# Patient Record
Sex: MALE | Race: WHITE | Employment: OTHER | URBAN - METROPOLITAN AREA
[De-identification: names, ages, dates, MRNs, and addresses within clinical notes are randomized per-mention and may not be internally consistent; named-entity substitution may affect disease eponyms.]

---

## 2023-05-25 ENCOUNTER — LAB REQUISITION (OUTPATIENT)
Dept: LAB | Facility: CLINIC | Age: 63
End: 2023-05-25

## 2023-05-25 PROCEDURE — 88342 IMHCHEM/IMCYTCHM 1ST ANTB: CPT | Mod: TC | Performed by: DENTIST

## 2024-04-03 RX ORDER — GABAPENTIN 300 MG/1
300 TABLET, FILM COATED ORAL
Status: ON HOLD | COMMUNITY
End: 2024-04-24

## 2024-04-03 RX ORDER — SIMVASTATIN 10 MG
10 TABLET ORAL AT BEDTIME
Status: ON HOLD | COMMUNITY
End: 2024-04-24

## 2024-04-04 NOTE — PROVIDER NOTIFICATION
Discharge plan according to [facility]:         04/03/24 1116   Discharge Planning   Patient/Family Anticipates Transition to home with family   Living Arrangements   People in Home significant other   Type of Residence Private Residence   Is your private residence a single family home or apartment? Single family home   Number of Stairs, Within Home, Primary ten   Stair Railings, Within Home, Primary railings safe and in good condition   Bathroom Shower/Tub Walk-in shower   Equipment Currently Used at Home raised toilet seat   Support System   Support Systems Spouse/Significant Other   Do you have someone available to stay with you one or two nights once you are home? Yes

## 2024-04-10 NOTE — H&P (VIEW-ONLY)
Preoperative History and Physical Examination    Date of Exam: 4/10/2024  Proposed Surgery: Left Total Knee Replacement  Surgeon: Dr Damián Dodson  Date of Surgery: 4/24/24  Location of Surgery:  [facility]  Fax number: [phone]    Joey Gross is an 63 y.o. male who presents for a preoperative clearance history and physical exam at the request of the above mentioned surgeon for the surgery indicated.     Copy of this evaluation and/or correspondence will be faxed to the above hospital/surgery center and/or surgeon's office.     INDICATION FOR SURGERY: Left knee arthritis    HPI: History of chronic knee pain, left knee arthritis following with orthopedics, recommends upcoming procedure.  Has a history of previous colon cancer, currently stable.  Has a history of ameloblastoma of the mandible.  Previously followed with OMFS.  Previous resection and doing well.  Does have a history of hyperlipidemia, takes Crestor.  Would be interested in trialing Viagra due to recent erectile dysfunction    CURRENT COMORBIDITIES: Hypertension and Cancer    USE OF ANTITHROMBOTIC: None    Current Outpatient Medications:   Medication Sig     acetaminophen (TYLENOL) 325 mg oral tablet Take 2 tablets (650 mg) by mouth every 6 (six) hours as needed.     cetirizine (ZYRTEC) 10 mg oral Cap Take 10 mg by mouth Once Daily.     cyclobenzaprine (FLEXERIL) 10 mg oral tablet Take 1 tablet (10 mg) by mouth three times a day as needed.     EPINEPHrine (EPIPEN) 0.3 mg/0.3 mL Injection auto-injector Inject 0.3 mL (0.3 mg) into the muscle one time as needed, may repeat x 1 for acute allergic reaction.     gabapentin (NEURONTIN) 300 mg oral capsule Take 1 capsule (300 mg) by mouth at bedtime.     ibuprofen 800 mg oral tablet Take 1 tablet (800 mg) by mouth every 8 (eight) hours as needed.     Oxycodone-Acetaminophen 7.5-325 mg oral tablet Take 1 tablet by mouth every 6 (six) hours as needed.     rosuvastatin (CRESTOR) 10 mg oral tablet  Take 1 tablet (10 mg) by mouth once daily.       Allergies   Allergen Reactions     Bee Sting  ([place]) Anaphylaxis       Past Medical History:   Diagnosis Date     Chronic back pain     lower     Colorectal cancer (HCC) 2023     Hyperlipidemia      Sinus infection      Tobacco abuse 2000    Quit in  after 20 py       Past Surgical History:   Procedure Laterality Date     COLONOSCOPY  2012    hyperplastic polyp     HX SURGERY Right 2020    Right Groin Excision Chronic Inflammation/Infection      MAXILLA OR MANDIBLE RESECTIO      2023     ORTHOPEDICS      bilateral knee arthroscopy     REPAIR INGUINAL HERNIA, SLIDING, ANY [id number] Right 2019       Family History   Problem Relation Name Age of Onset     Alzheimer's Disease Maternal Grandfather       Arthritis Mother       High Blood Pressure Mother       Diabetes Father       Skin Melanoma Father       High Cholesterol Father       Heart Disease Father       Lymphoma Father         Social History     Tobacco Use     Smoking status: Former     Current packs/day: 0.00     Types: Cigarettes     Quit date: 2000     Years since quittin.9     Passive exposure: Past     Smokeless tobacco: Never     Tobacco comments:     quit smoking around age 41   Vaping Use     Vaping status: Never Used   Substance Use Topics     Alcohol use: Yes     Comment: rarely     Drug use: No       History of Anesthetic Reaction (personal or family)? no    Recent steroid use (last 6 months)? no    Immunizations up to date? yes    Immunization History   Administered Date(s) Administered     - Fluvirin, 4 Yrs + 10/21/2010     Influenza 10/09/2007, 2008, 10/16/2009, 2022     Moderna 12+ Yrs Bivalent COVID Vaccine 2022     Pfizer 12+ Yrs MONOVALENT COVID Vaccine 2022     Pfizer 12+ Yrs MONOVALENT COVID Vaccine (purple cap) 2021, 2021     Tdap 2017       REVIEW OF SYSTEMS:  see HPI; otherwise denies HEENT, NECK, RESP, CARDIAC,  "GI, , NEURO or PSYCH Sx    REVISED CARDIAC RISK INDEX:   History of Ischemic Heart Disease: no  History of Congestive Heart Failure: no  History of Cerebrovascular Disease (stroke/TIA): no  History of Diabetes requiring preoperative insulin use: no  Chronic Kidney Disease (Cr > 2): no  Undergoing suprainguinal vascular, intraperitoneal, or intrathoracic surgery: no  Risk for cardiac death, non-fatal MI, and non-fatal cardiac arrest: 0 predictors - 0.4%    PHYSICAL EXAM:   /88 (BP Cuff Site: Left arm, BP Cuff Position: Sitting, BP Cuff Size: Large Adult)   Pulse 72   Temp 97.4  F (36.3  C) (Tympanic)   Resp 16   Ht 5' 8\" (1.727 m)   Wt 118 kg (260 lb 1.6 oz)   SpO2 96%   BMI 39.55 kg/m    LMP: No LMP for male patient.  General - Alert & oriented, pleasant and comfortable.   Head - Normocephalic, atraumatic.  Eyes - Pupils are equal, round and reactive to light bilaterally.  Extraocular movements are intact bilaterally. Sclera and conjunctiva clear. Lids without lesions  Ears - Tympanic membranes clear bilaterally. External canals without lesion.  Nose - Nares normal. Septum midline. Mucosa normal.   Mouth - Oropharynx is clear without exudates.  Neck - Normal appearing, no cervical adenopathy or carotid bruits noted.  Lungs - Clear to auscultation bilaterally, no wheezes, rales or rhonchi.  CV - Regular rate and rhythm, no murmurs, rubs or gallops.  Abdomen - Non-tender, non-distended, positive bowel sounds, no masses, no hepatosplenomegaly. No rebound or guarding.   Extremities - No edema or deformities. Palpable pulses strong bilaterally.  Skin - warm, dry, intact. No rashes or erythema.  Neurologic - Cranial nerves 2-12 intact, patellar reflexes intact. Muscle tone, bulk and strength within normal limits throughout.  Psych - Judgment and mental status are clear, patient has reasonable insight. Mood is stable.    LABS/IMAGING:     No results found for this or any previous visit (from the past 24 " hour(s)).    Labs not resulted here are pending and will be faxed     EKG: [facility]                                                                                   Test Date:    2024-04-10   Pat Name:     ANNA CANTU            Department:   [facility]   Patient ID:   [id number]                   Room:           Gender:       M                        Technician:   Anitha   :          1960               Requested By: BEULAH LI MD   Order Number: [id number]                Reading MD:   JOANNE Jarquin                                    Measurements   Intervals                              Axis            Rate:         65                       P:            39   NH:           171                      QRS:          71   QRSD:         91                       T:            62   QT:           416                                      QTc:          427                                                                 Interpretive Statements   SINUS RHYTHM   Compared to ECG 2023 13:56:16   No significant changes   Electronically    Signed On 4- 10:20:13 CDT by JOANNE Jarquin   CXR: Not indicated  Spirometry: Not indicated    ASSESSMENT/PLAN:    Herson was seen today for pre op exam.    Diagnoses and all orders for this visit:    Pre-operative clearance  -     EKG WITH INTERPRETATION/REPORT  -     CBC/DIFFERENTIAL OP  -     BASIC METABOLIC PANEL 8 ([facility])    Preoperative examination    Erectile dysfunction, unspecified erectile dysfunction type  -     sildenafiL (VIAGRA) 100 mg oral tablet; Take 0.5 tablets (50 mg) by mouth once a day as needed.    Screening for colon cancer  -     REFERRAL COLONOSCOPY: [facility]    Other orders  -     gabapentin (NEURONTIN) 100 mg oral capsule; Take 3 capsules (300 mg) by mouth three times a day.          RECOMMENDATIONS:   Preoperative Risk Assesment:  - Based on the inherent risks of procedure, anesthesia, and the patient's comorbid  medical conditions, the patient is stratified as a medium risk candidate for the planned procedure.  - Further tests are not advised to further risk stratify Joey Gross prior to surgery.    Patient was advised to D/c all NSAID's, fish oil, and ASA derivatives one week prior to surgery, and that these may be resumed postoperatively unless instructed otherwise.     Patient does not require perioperative bridging of anticoagulation.     Other diagnoses as noted in the Assessment and Plan are stable at the present time unless otherwise stated.     Additional recommendations: none      Advised to bring Advanced Directive on day of surgery if one is available.     Patient had the opportunity to have all his questions answered at today's visit.     aJmes Mcelroy PA-C    Total time spent today for visit was 45 minutes and included: Direct face-to-face time, Review of records, and Documentation of visit    Today's care for complex conditions is part of an established longitudinal relationship.      .

## 2024-04-19 RX ORDER — SILDENAFIL 100 MG/1
50 TABLET, FILM COATED ORAL DAILY PRN
COMMUNITY

## 2024-04-24 ENCOUNTER — ANESTHESIA (OUTPATIENT)
Dept: SURGERY | Facility: CLINIC | Age: 64
End: 2024-04-24
Payer: COMMERCIAL

## 2024-04-24 ENCOUNTER — HOSPITAL ENCOUNTER (OUTPATIENT)
Facility: CLINIC | Age: 64
Discharge: HOME | End: 2024-04-25
Attending: STUDENT IN AN ORGANIZED HEALTH CARE EDUCATION/TRAINING PROGRAM | Admitting: STUDENT IN AN ORGANIZED HEALTH CARE EDUCATION/TRAINING PROGRAM
Payer: COMMERCIAL

## 2024-04-24 ENCOUNTER — APPOINTMENT (OUTPATIENT)
Dept: RADIOLOGY | Facility: CLINIC | Age: 64
End: 2024-04-24
Payer: COMMERCIAL

## 2024-04-24 ENCOUNTER — ANCILLARY PROCEDURE (OUTPATIENT)
Dept: ULTRASOUND IMAGING | Facility: CLINIC | Age: 64
End: 2024-04-24
Attending: STUDENT IN AN ORGANIZED HEALTH CARE EDUCATION/TRAINING PROGRAM
Payer: COMMERCIAL

## 2024-04-24 ENCOUNTER — ANESTHESIA EVENT (OUTPATIENT)
Dept: SURGERY | Facility: CLINIC | Age: 64
End: 2024-04-24
Payer: COMMERCIAL

## 2024-04-24 DIAGNOSIS — Z96.652 STATUS POST TOTAL KNEE REPLACEMENT USING CEMENT, LEFT: Primary | ICD-10-CM

## 2024-04-24 PROCEDURE — 99207 PR APP CREDIT; MD BILLING SHARED VISIT: CPT

## 2024-04-24 PROCEDURE — 250N000013 HC RX MED GY IP 250 OP 250 PS 637

## 2024-04-24 PROCEDURE — 999N000141 HC STATISTIC PRE-PROCEDURE NURSING ASSESSMENT: Performed by: STUDENT IN AN ORGANIZED HEALTH CARE EDUCATION/TRAINING PROGRAM

## 2024-04-24 PROCEDURE — 250N000011 HC RX IP 250 OP 636

## 2024-04-24 PROCEDURE — 250N000011 HC RX IP 250 OP 636: Performed by: NURSE ANESTHETIST, CERTIFIED REGISTERED

## 2024-04-24 PROCEDURE — 258N000003 HC RX IP 258 OP 636

## 2024-04-24 PROCEDURE — 99205 OFFICE O/P NEW HI 60 MIN: CPT | Mod: FS | Performed by: STUDENT IN AN ORGANIZED HEALTH CARE EDUCATION/TRAINING PROGRAM

## 2024-04-24 PROCEDURE — C1713 ANCHOR/SCREW BN/BN,TIS/BN: HCPCS | Performed by: STUDENT IN AN ORGANIZED HEALTH CARE EDUCATION/TRAINING PROGRAM

## 2024-04-24 PROCEDURE — 250N000011 HC RX IP 250 OP 636: Performed by: STUDENT IN AN ORGANIZED HEALTH CARE EDUCATION/TRAINING PROGRAM

## 2024-04-24 PROCEDURE — 710N000010 HC RECOVERY PHASE 1, LEVEL 2, PER MIN: Performed by: STUDENT IN AN ORGANIZED HEALTH CARE EDUCATION/TRAINING PROGRAM

## 2024-04-24 PROCEDURE — 250N000009 HC RX 250: Performed by: NURSE ANESTHETIST, CERTIFIED REGISTERED

## 2024-04-24 PROCEDURE — 258N000003 HC RX IP 258 OP 636: Performed by: STUDENT IN AN ORGANIZED HEALTH CARE EDUCATION/TRAINING PROGRAM

## 2024-04-24 PROCEDURE — 272N000001 HC OR GENERAL SUPPLY STERILE: Performed by: STUDENT IN AN ORGANIZED HEALTH CARE EDUCATION/TRAINING PROGRAM

## 2024-04-24 PROCEDURE — 999N000065 XR KNEE PORT LEFT 1/2 VIEWS: Mod: LT

## 2024-04-24 PROCEDURE — 360N000077 HC SURGERY LEVEL 4, PER MIN: Performed by: STUDENT IN AN ORGANIZED HEALTH CARE EDUCATION/TRAINING PROGRAM

## 2024-04-24 PROCEDURE — C1776 JOINT DEVICE (IMPLANTABLE): HCPCS | Performed by: STUDENT IN AN ORGANIZED HEALTH CARE EDUCATION/TRAINING PROGRAM

## 2024-04-24 PROCEDURE — 370N000017 HC ANESTHESIA TECHNICAL FEE, PER MIN: Performed by: STUDENT IN AN ORGANIZED HEALTH CARE EDUCATION/TRAINING PROGRAM

## 2024-04-24 PROCEDURE — 258N000001 HC RX 258: Performed by: STUDENT IN AN ORGANIZED HEALTH CARE EDUCATION/TRAINING PROGRAM

## 2024-04-24 PROCEDURE — 250N000009 HC RX 250

## 2024-04-24 PROCEDURE — 250N000009 HC RX 250: Performed by: STUDENT IN AN ORGANIZED HEALTH CARE EDUCATION/TRAINING PROGRAM

## 2024-04-24 DEVICE — ATTUNE KNEE SYSTEM TIBIAL BASE FIXED BEARING SIZE 7 CEMENTED
Type: IMPLANTABLE DEVICE | Site: KNEE | Status: FUNCTIONAL
Brand: ATTUNE

## 2024-04-24 DEVICE — FULL DOSE BONE CEMENT, 10 PACK CATALOG NUMBER IS 6191-1-010
Type: IMPLANTABLE DEVICE | Site: KNEE | Status: FUNCTIONAL
Brand: SIMPLEX

## 2024-04-24 DEVICE — ATTUNE KNEE SYSTEM TIBIAL INSERT FIXED BEARING MEDIAL STABILIZED LEFT AOX 6, 6MM
Type: IMPLANTABLE DEVICE | Site: KNEE | Status: FUNCTIONAL
Brand: ATTUNE

## 2024-04-24 DEVICE — ATTUNE KNEE SYSTEM FEMORAL CRUCIATE RETAINING SIZE 6 LEFT CEMENTED
Type: IMPLANTABLE DEVICE | Site: KNEE | Status: FUNCTIONAL
Brand: ATTUNE

## 2024-04-24 RX ORDER — CEFAZOLIN SODIUM/WATER 2 G/20 ML
2 SYRINGE (ML) INTRAVENOUS
Status: COMPLETED | OUTPATIENT
Start: 2024-04-24 | End: 2024-04-24

## 2024-04-24 RX ORDER — HYDROMORPHONE HCL IN WATER/PF 6 MG/30 ML
0.4 PATIENT CONTROLLED ANALGESIA SYRINGE INTRAVENOUS EVERY 5 MIN PRN
Status: DISCONTINUED | OUTPATIENT
Start: 2024-04-24 | End: 2024-04-24 | Stop reason: HOSPADM

## 2024-04-24 RX ORDER — ONDANSETRON 2 MG/ML
4 INJECTION INTRAMUSCULAR; INTRAVENOUS EVERY 30 MIN PRN
Status: CANCELLED | OUTPATIENT
Start: 2024-04-24

## 2024-04-24 RX ORDER — ACETAMINOPHEN 325 MG/1
650 TABLET ORAL EVERY 4 HOURS PRN
Status: DISCONTINUED | OUTPATIENT
Start: 2024-04-27 | End: 2024-04-25 | Stop reason: HOSPADM

## 2024-04-24 RX ORDER — ROSUVASTATIN CALCIUM 10 MG/1
10 TABLET, COATED ORAL DAILY
Status: DISCONTINUED | OUTPATIENT
Start: 2024-04-24 | End: 2024-04-25 | Stop reason: HOSPADM

## 2024-04-24 RX ORDER — KETAMINE HYDROCHLORIDE 10 MG/ML
INJECTION INTRAMUSCULAR; INTRAVENOUS PRN
Status: DISCONTINUED | OUTPATIENT
Start: 2024-04-24 | End: 2024-04-24

## 2024-04-24 RX ORDER — ONDANSETRON 2 MG/ML
4 INJECTION INTRAMUSCULAR; INTRAVENOUS EVERY 30 MIN PRN
Status: DISCONTINUED | OUTPATIENT
Start: 2024-04-24 | End: 2024-04-24 | Stop reason: HOSPADM

## 2024-04-24 RX ORDER — GABAPENTIN 100 MG/1
200-300 CAPSULE ORAL AT BEDTIME
Status: DISCONTINUED | OUTPATIENT
Start: 2024-04-24 | End: 2024-04-25 | Stop reason: HOSPADM

## 2024-04-24 RX ORDER — HYDROMORPHONE HCL IN WATER/PF 6 MG/30 ML
0.4 PATIENT CONTROLLED ANALGESIA SYRINGE INTRAVENOUS
Status: DISCONTINUED | OUTPATIENT
Start: 2024-04-24 | End: 2024-04-25 | Stop reason: HOSPADM

## 2024-04-24 RX ORDER — ASPIRIN 81 MG/1
81 TABLET ORAL 2 TIMES DAILY
Status: DISCONTINUED | OUTPATIENT
Start: 2024-04-24 | End: 2024-04-25 | Stop reason: HOSPADM

## 2024-04-24 RX ORDER — MULTIPLE VITAMINS W/ MINERALS TAB 9MG-400MCG
1 TAB ORAL DAILY
COMMUNITY

## 2024-04-24 RX ORDER — AMOXICILLIN 250 MG
1 CAPSULE ORAL 2 TIMES DAILY
Status: DISCONTINUED | OUTPATIENT
Start: 2024-04-24 | End: 2024-04-25 | Stop reason: HOSPADM

## 2024-04-24 RX ORDER — ONDANSETRON 2 MG/ML
4 INJECTION INTRAMUSCULAR; INTRAVENOUS EVERY 6 HOURS PRN
Status: DISCONTINUED | OUTPATIENT
Start: 2024-04-24 | End: 2024-04-25 | Stop reason: HOSPADM

## 2024-04-24 RX ORDER — NALOXONE HYDROCHLORIDE 0.4 MG/ML
0.2 INJECTION, SOLUTION INTRAMUSCULAR; INTRAVENOUS; SUBCUTANEOUS
Status: DISCONTINUED | OUTPATIENT
Start: 2024-04-24 | End: 2024-04-25 | Stop reason: HOSPADM

## 2024-04-24 RX ORDER — BISACODYL 10 MG
10 SUPPOSITORY, RECTAL RECTAL DAILY PRN
Status: DISCONTINUED | OUTPATIENT
Start: 2024-04-27 | End: 2024-04-25 | Stop reason: HOSPADM

## 2024-04-24 RX ORDER — OXYCODONE HYDROCHLORIDE 5 MG/1
10 TABLET ORAL EVERY 4 HOURS PRN
Status: DISCONTINUED | OUTPATIENT
Start: 2024-04-24 | End: 2024-04-25 | Stop reason: HOSPADM

## 2024-04-24 RX ORDER — CEFAZOLIN SODIUM/WATER 2 G/20 ML
2 SYRINGE (ML) INTRAVENOUS SEE ADMIN INSTRUCTIONS
Status: DISCONTINUED | OUTPATIENT
Start: 2024-04-24 | End: 2024-04-24 | Stop reason: HOSPADM

## 2024-04-24 RX ORDER — LIDOCAINE 40 MG/G
CREAM TOPICAL
Status: DISCONTINUED | OUTPATIENT
Start: 2024-04-24 | End: 2024-04-25 | Stop reason: HOSPADM

## 2024-04-24 RX ORDER — ONDANSETRON 2 MG/ML
INJECTION INTRAMUSCULAR; INTRAVENOUS PRN
Status: DISCONTINUED | OUTPATIENT
Start: 2024-04-24 | End: 2024-04-24

## 2024-04-24 RX ORDER — NALOXONE HYDROCHLORIDE 0.4 MG/ML
0.4 INJECTION, SOLUTION INTRAMUSCULAR; INTRAVENOUS; SUBCUTANEOUS
Status: DISCONTINUED | OUTPATIENT
Start: 2024-04-24 | End: 2024-04-25 | Stop reason: HOSPADM

## 2024-04-24 RX ORDER — HYDROMORPHONE HCL IN WATER/PF 6 MG/30 ML
0.2 PATIENT CONTROLLED ANALGESIA SYRINGE INTRAVENOUS EVERY 5 MIN PRN
Status: DISCONTINUED | OUTPATIENT
Start: 2024-04-24 | End: 2024-04-24 | Stop reason: HOSPADM

## 2024-04-24 RX ORDER — ONDANSETRON 4 MG/1
4 TABLET, ORALLY DISINTEGRATING ORAL EVERY 6 HOURS PRN
Status: DISCONTINUED | OUTPATIENT
Start: 2024-04-24 | End: 2024-04-25 | Stop reason: HOSPADM

## 2024-04-24 RX ORDER — METHOCARBAMOL 750 MG/1
750 TABLET, FILM COATED ORAL EVERY 6 HOURS PRN
Status: DISCONTINUED | OUTPATIENT
Start: 2024-04-24 | End: 2024-04-25 | Stop reason: HOSPADM

## 2024-04-24 RX ORDER — PROCHLORPERAZINE MALEATE 10 MG
10 TABLET ORAL EVERY 6 HOURS PRN
Status: DISCONTINUED | OUTPATIENT
Start: 2024-04-24 | End: 2024-04-25 | Stop reason: HOSPADM

## 2024-04-24 RX ORDER — SODIUM CHLORIDE, SODIUM LACTATE, POTASSIUM CHLORIDE, CALCIUM CHLORIDE 600; 310; 30; 20 MG/100ML; MG/100ML; MG/100ML; MG/100ML
INJECTION, SOLUTION INTRAVENOUS CONTINUOUS
Status: DISCONTINUED | OUTPATIENT
Start: 2024-04-24 | End: 2024-04-24 | Stop reason: HOSPADM

## 2024-04-24 RX ORDER — BUPIVACAINE HYDROCHLORIDE 5 MG/ML
INJECTION, SOLUTION EPIDURAL; INTRACAUDAL
Status: COMPLETED | OUTPATIENT
Start: 2024-04-24 | End: 2024-04-24

## 2024-04-24 RX ORDER — ASPIRIN 81 MG/1
81 TABLET ORAL 2 TIMES DAILY
Qty: 60 TABLET | Refills: 0 | Status: SHIPPED | OUTPATIENT
Start: 2024-04-24

## 2024-04-24 RX ORDER — OXYCODONE HYDROCHLORIDE 5 MG/1
5-10 TABLET ORAL EVERY 4 HOURS PRN
Qty: 26 TABLET | Refills: 0 | Status: SHIPPED | OUTPATIENT
Start: 2024-04-24

## 2024-04-24 RX ORDER — EPINEPHRINE 0.3 MG/.3ML
0.3 INJECTION SUBCUTANEOUS PRN
COMMUNITY

## 2024-04-24 RX ORDER — ACETAMINOPHEN 325 MG/1
975 TABLET ORAL EVERY 8 HOURS
Status: DISCONTINUED | OUTPATIENT
Start: 2024-04-24 | End: 2024-04-25 | Stop reason: HOSPADM

## 2024-04-24 RX ORDER — OXYCODONE HYDROCHLORIDE 5 MG/1
5 TABLET ORAL
Status: CANCELLED | OUTPATIENT
Start: 2024-04-24

## 2024-04-24 RX ORDER — HYDROMORPHONE HCL IN WATER/PF 6 MG/30 ML
0.2 PATIENT CONTROLLED ANALGESIA SYRINGE INTRAVENOUS
Status: DISCONTINUED | OUTPATIENT
Start: 2024-04-24 | End: 2024-04-25 | Stop reason: HOSPADM

## 2024-04-24 RX ORDER — ONDANSETRON 4 MG/1
4 TABLET, ORALLY DISINTEGRATING ORAL EVERY 30 MIN PRN
Status: CANCELLED | OUTPATIENT
Start: 2024-04-24

## 2024-04-24 RX ORDER — GABAPENTIN 100 MG/1
200-300 CAPSULE ORAL AT BEDTIME
COMMUNITY

## 2024-04-24 RX ORDER — CEFAZOLIN SODIUM 2 G/100ML
2 INJECTION, SOLUTION INTRAVENOUS EVERY 8 HOURS
Qty: 200 ML | Refills: 0 | Status: COMPLETED | OUTPATIENT
Start: 2024-04-24 | End: 2024-04-25

## 2024-04-24 RX ORDER — FENTANYL CITRATE 50 UG/ML
25 INJECTION, SOLUTION INTRAMUSCULAR; INTRAVENOUS EVERY 5 MIN PRN
Status: DISCONTINUED | OUTPATIENT
Start: 2024-04-24 | End: 2024-04-24

## 2024-04-24 RX ORDER — POLYETHYLENE GLYCOL 3350 17 G/17G
17 POWDER, FOR SOLUTION ORAL DAILY
Status: DISCONTINUED | OUTPATIENT
Start: 2024-04-25 | End: 2024-04-25 | Stop reason: HOSPADM

## 2024-04-24 RX ORDER — AMOXICILLIN 250 MG
1-2 CAPSULE ORAL 2 TIMES DAILY
Qty: 30 TABLET | Refills: 0 | Status: SHIPPED | OUTPATIENT
Start: 2024-04-24

## 2024-04-24 RX ORDER — LIDOCAINE HYDROCHLORIDE 10 MG/ML
INJECTION, SOLUTION INFILTRATION; PERINEURAL PRN
Status: DISCONTINUED | OUTPATIENT
Start: 2024-04-24 | End: 2024-04-24

## 2024-04-24 RX ORDER — OXYCODONE HYDROCHLORIDE 5 MG/1
10 TABLET ORAL
Status: CANCELLED | OUTPATIENT
Start: 2024-04-24

## 2024-04-24 RX ORDER — PROPOFOL 10 MG/ML
INJECTION, EMULSION INTRAVENOUS PRN
Status: DISCONTINUED | OUTPATIENT
Start: 2024-04-24 | End: 2024-04-24

## 2024-04-24 RX ORDER — DEXAMETHASONE SODIUM PHOSPHATE 10 MG/ML
INJECTION, SOLUTION INTRAMUSCULAR; INTRAVENOUS PRN
Status: DISCONTINUED | OUTPATIENT
Start: 2024-04-24 | End: 2024-04-24

## 2024-04-24 RX ORDER — CETIRIZINE HYDROCHLORIDE 10 MG/1
10 TABLET ORAL DAILY
Status: DISCONTINUED | OUTPATIENT
Start: 2024-04-25 | End: 2024-04-25 | Stop reason: HOSPADM

## 2024-04-24 RX ORDER — HYDROXYZINE HYDROCHLORIDE 25 MG/1
25 TABLET, FILM COATED ORAL EVERY 6 HOURS PRN
Status: DISCONTINUED | OUTPATIENT
Start: 2024-04-24 | End: 2024-04-25 | Stop reason: HOSPADM

## 2024-04-24 RX ORDER — SODIUM CHLORIDE, SODIUM LACTATE, POTASSIUM CHLORIDE, CALCIUM CHLORIDE 600; 310; 30; 20 MG/100ML; MG/100ML; MG/100ML; MG/100ML
INJECTION, SOLUTION INTRAVENOUS CONTINUOUS
Status: DISCONTINUED | OUTPATIENT
Start: 2024-04-24 | End: 2024-04-25 | Stop reason: HOSPADM

## 2024-04-24 RX ORDER — NALOXONE HYDROCHLORIDE 0.4 MG/ML
0.1 INJECTION, SOLUTION INTRAMUSCULAR; INTRAVENOUS; SUBCUTANEOUS
Status: CANCELLED | OUTPATIENT
Start: 2024-04-24

## 2024-04-24 RX ORDER — ONDANSETRON 4 MG/1
4 TABLET, ORALLY DISINTEGRATING ORAL EVERY 30 MIN PRN
Status: DISCONTINUED | OUTPATIENT
Start: 2024-04-24 | End: 2024-04-24 | Stop reason: HOSPADM

## 2024-04-24 RX ORDER — ROSUVASTATIN CALCIUM 10 MG/1
10 TABLET, COATED ORAL DAILY
COMMUNITY

## 2024-04-24 RX ORDER — FENTANYL CITRATE 50 UG/ML
50 INJECTION, SOLUTION INTRAMUSCULAR; INTRAVENOUS EVERY 5 MIN PRN
Status: DISCONTINUED | OUTPATIENT
Start: 2024-04-24 | End: 2024-04-24 | Stop reason: HOSPADM

## 2024-04-24 RX ORDER — TRANEXAMIC ACID 650 MG/1
1950 TABLET ORAL ONCE
Status: COMPLETED | OUTPATIENT
Start: 2024-04-24 | End: 2024-04-24

## 2024-04-24 RX ORDER — FENTANYL CITRATE 50 UG/ML
100 INJECTION, SOLUTION INTRAMUSCULAR; INTRAVENOUS
Status: DISCONTINUED | OUTPATIENT
Start: 2024-04-24 | End: 2024-04-24

## 2024-04-24 RX ORDER — ACETAMINOPHEN 325 MG/1
650 TABLET ORAL EVERY 4 HOURS PRN
Qty: 100 TABLET | Refills: 0 | Status: SHIPPED | OUTPATIENT
Start: 2024-04-24

## 2024-04-24 RX ORDER — OXYCODONE HYDROCHLORIDE 5 MG/1
5 TABLET ORAL EVERY 4 HOURS PRN
Status: DISCONTINUED | OUTPATIENT
Start: 2024-04-24 | End: 2024-04-25 | Stop reason: HOSPADM

## 2024-04-24 RX ORDER — LIDOCAINE 40 MG/G
CREAM TOPICAL
Status: DISCONTINUED | OUTPATIENT
Start: 2024-04-24 | End: 2024-04-24 | Stop reason: HOSPADM

## 2024-04-24 RX ORDER — FENTANYL CITRATE 50 UG/ML
INJECTION, SOLUTION INTRAMUSCULAR; INTRAVENOUS PRN
Status: DISCONTINUED | OUTPATIENT
Start: 2024-04-24 | End: 2024-04-24

## 2024-04-24 RX ORDER — CETIRIZINE HYDROCHLORIDE 10 MG/1
10 TABLET ORAL DAILY
COMMUNITY

## 2024-04-24 RX ORDER — PROPOFOL 10 MG/ML
INJECTION, EMULSION INTRAVENOUS CONTINUOUS PRN
Status: DISCONTINUED | OUTPATIENT
Start: 2024-04-24 | End: 2024-04-24

## 2024-04-24 RX ORDER — NALOXONE HYDROCHLORIDE 0.4 MG/ML
0.1 INJECTION, SOLUTION INTRAMUSCULAR; INTRAVENOUS; SUBCUTANEOUS
Status: DISCONTINUED | OUTPATIENT
Start: 2024-04-24 | End: 2024-04-24 | Stop reason: HOSPADM

## 2024-04-24 RX ADMIN — FENTANYL CITRATE 50 MCG: 50 INJECTION, SOLUTION INTRAMUSCULAR; INTRAVENOUS at 15:57

## 2024-04-24 RX ADMIN — FENTANYL CITRATE 50 MCG: 50 INJECTION, SOLUTION INTRAMUSCULAR; INTRAVENOUS at 12:18

## 2024-04-24 RX ADMIN — ACETAMINOPHEN 975 MG: 325 TABLET ORAL at 17:50

## 2024-04-24 RX ADMIN — DEXAMETHASONE SODIUM PHOSPHATE 4 MG: 10 INJECTION, SOLUTION INTRAMUSCULAR; INTRAVENOUS at 13:24

## 2024-04-24 RX ADMIN — LIDOCAINE HYDROCHLORIDE 5 ML: 10 INJECTION, SOLUTION INFILTRATION; PERINEURAL at 13:24

## 2024-04-24 RX ADMIN — MIDAZOLAM HYDROCHLORIDE 2 MG: 1 INJECTION, SOLUTION INTRAMUSCULAR; INTRAVENOUS at 12:18

## 2024-04-24 RX ADMIN — SODIUM CHLORIDE, POTASSIUM CHLORIDE, SODIUM LACTATE AND CALCIUM CHLORIDE: 600; 310; 30; 20 INJECTION, SOLUTION INTRAVENOUS at 17:52

## 2024-04-24 RX ADMIN — FENTANYL CITRATE 100 MCG: 50 INJECTION INTRAMUSCULAR; INTRAVENOUS at 13:24

## 2024-04-24 RX ADMIN — HYDROMORPHONE HYDROCHLORIDE 0.4 MG: 0.2 INJECTION, SOLUTION INTRAMUSCULAR; INTRAVENOUS; SUBCUTANEOUS at 16:37

## 2024-04-24 RX ADMIN — Medication 2 G: at 13:18

## 2024-04-24 RX ADMIN — HYDROMORPHONE HYDROCHLORIDE 0.4 MG: 0.2 INJECTION, SOLUTION INTRAMUSCULAR; INTRAVENOUS; SUBCUTANEOUS at 16:08

## 2024-04-24 RX ADMIN — FENTANYL CITRATE 25 MCG: 50 INJECTION, SOLUTION INTRAMUSCULAR; INTRAVENOUS at 15:51

## 2024-04-24 RX ADMIN — PROPOFOL 180 MG: 10 INJECTION, EMULSION INTRAVENOUS at 13:24

## 2024-04-24 RX ADMIN — HYDROMORPHONE HYDROCHLORIDE 0.4 MG: 0.2 INJECTION, SOLUTION INTRAMUSCULAR; INTRAVENOUS; SUBCUTANEOUS at 16:25

## 2024-04-24 RX ADMIN — OXYCODONE 5 MG: 5 TABLET ORAL at 17:08

## 2024-04-24 RX ADMIN — HYDROMORPHONE HYDROCHLORIDE 0.5 MG: 1 INJECTION, SOLUTION INTRAMUSCULAR; INTRAVENOUS; SUBCUTANEOUS at 14:07

## 2024-04-24 RX ADMIN — HYDROMORPHONE HYDROCHLORIDE 0.4 MG: 0.2 INJECTION, SOLUTION INTRAMUSCULAR; INTRAVENOUS; SUBCUTANEOUS at 16:14

## 2024-04-24 RX ADMIN — PROPOFOL 150 MCG/KG/MIN: 10 INJECTION, EMULSION INTRAVENOUS at 13:27

## 2024-04-24 RX ADMIN — GABAPENTIN 200 MG: 100 CAPSULE ORAL at 21:48

## 2024-04-24 RX ADMIN — CEFAZOLIN SODIUM 2 G: 2 INJECTION, SOLUTION INTRAVENOUS at 21:48

## 2024-04-24 RX ADMIN — ROCURONIUM BROMIDE 60 MG: 10 INJECTION, SOLUTION INTRAVENOUS at 13:24

## 2024-04-24 RX ADMIN — ASPIRIN 81 MG: 81 TABLET, COATED ORAL at 21:48

## 2024-04-24 RX ADMIN — BUPIVACAINE HYDROCHLORIDE 15 ML: 5 INJECTION, SOLUTION EPIDURAL; INTRACAUDAL; PERINEURAL at 12:20

## 2024-04-24 RX ADMIN — SENNOSIDES AND DOCUSATE SODIUM 1 TABLET: 8.6; 5 TABLET ORAL at 21:48

## 2024-04-24 RX ADMIN — SUGAMMADEX 200 MG: 100 INJECTION, SOLUTION INTRAVENOUS at 15:33

## 2024-04-24 RX ADMIN — SODIUM CHLORIDE, POTASSIUM CHLORIDE, SODIUM LACTATE AND CALCIUM CHLORIDE: 600; 310; 30; 20 INJECTION, SOLUTION INTRAVENOUS at 10:49

## 2024-04-24 RX ADMIN — KETAMINE HYDROCHLORIDE 30 MG: 10 INJECTION INTRAMUSCULAR; INTRAVENOUS at 13:52

## 2024-04-24 RX ADMIN — HYDROMORPHONE HYDROCHLORIDE 0.5 MG: 1 INJECTION, SOLUTION INTRAMUSCULAR; INTRAVENOUS; SUBCUTANEOUS at 13:53

## 2024-04-24 RX ADMIN — TRANEXAMIC ACID 1950 MG: 650 TABLET ORAL at 10:13

## 2024-04-24 RX ADMIN — SODIUM CHLORIDE, POTASSIUM CHLORIDE, SODIUM LACTATE AND CALCIUM CHLORIDE: 600; 310; 30; 20 INJECTION, SOLUTION INTRAVENOUS at 14:37

## 2024-04-24 RX ADMIN — KETAMINE HYDROCHLORIDE 20 MG: 10 INJECTION INTRAMUSCULAR; INTRAVENOUS at 14:02

## 2024-04-24 RX ADMIN — ROSUVASTATIN CALCIUM 10 MG: 10 TABLET, FILM COATED ORAL at 18:45

## 2024-04-24 RX ADMIN — HYDROMORPHONE HYDROCHLORIDE 0.4 MG: 0.2 INJECTION, SOLUTION INTRAMUSCULAR; INTRAVENOUS; SUBCUTANEOUS at 16:02

## 2024-04-24 RX ADMIN — ONDANSETRON 4 MG: 2 INJECTION INTRAMUSCULAR; INTRAVENOUS at 14:47

## 2024-04-24 ASSESSMENT — ACTIVITIES OF DAILY LIVING (ADL)
ADLS_ACUITY_SCORE: 33
ADLS_ACUITY_SCORE: 33
ADLS_ACUITY_SCORE: 34
ADLS_ACUITY_SCORE: 34
ADLS_ACUITY_SCORE: 33
ADLS_ACUITY_SCORE: 33
ADLS_ACUITY_SCORE: 34
ADLS_ACUITY_SCORE: 31
ADLS_ACUITY_SCORE: 34
ADLS_ACUITY_SCORE: 33
ADLS_ACUITY_SCORE: 33
ADLS_ACUITY_SCORE: 34
ADLS_ACUITY_SCORE: 33
ADLS_ACUITY_SCORE: 33

## 2024-04-24 NOTE — ANESTHESIA PREPROCEDURE EVALUATION
"Anesthesia Pre-Procedure Evaluation    Patient: Joey Gross   MRN: [id number] : 1960        Procedure : Procedure(s):  LEFT TOTAL KNEE ARTHROPLASTY          Past Medical History:   Diagnosis Date     Arthritis      Colon cancer (H)      History of blood transfusion      Obese      PONV (postoperative nausea and vomiting)       History reviewed. No pertinent surgical history.   Allergies   Allergen Reactions     Bee Venom Anaphylaxis      Social History     Tobacco Use     Smoking status: Former     Current packs/day: 0.00     Types: Cigarettes     Quit date:      Years since quittin.3     Smokeless tobacco: Never   Substance Use Topics     Alcohol use: Yes     Comment: rarely      Wt Readings from Last 1 Encounters:   24 117 kg (258 lb)        Anesthesia Evaluation            ROS/MED HX  ENT/Pulmonary:       Neurologic:       Cardiovascular:       METS/Exercise Tolerance:     Hematologic:       Musculoskeletal:   (+)  arthritis,             GI/Hepatic:       Renal/Genitourinary:       Endo:     (+)               Obesity,       Psychiatric/Substance Use:       Infectious Disease:       Malignancy:       Other:            Physical Exam    Airway        Mallampati: II   TM distance: > 3 FB   Neck ROM: full     Respiratory Devices and Support         Dental           Cardiovascular   cardiovascular exam normal          Pulmonary   pulmonary exam normal              OUTSIDE LABS:  CBC: No results found for: \"WBC\", \"HGB\", \"HCT\", \"PLT\"  BMP: No results found for: \"NA\", \"POTASSIUM\", \"CHLORIDE\", \"CO2\", \"BUN\", \"CR\", \"GLC\"  COAGS: No results found for: \"PTT\", \"INR\", \"FIBR\"  POC: No results found for: \"BGM\", \"HCG\", \"HCGS\"  HEPATIC: No results found for: \"ALBUMIN\", \"PROTTOTAL\", \"ALT\", \"AST\", \"GGT\", \"ALKPHOS\", \"BILITOTAL\", \"BILIDIRECT\", \"HOLA\"  OTHER: No results found for: \"PH\", \"LACT\", \"A1C\", \"JOSSELIN\", \"PHOS\", \"MAG\", \"LIPASE\", \"AMYLASE\", \"TSH\", \"T4\", \"T3\", \"CRP\", \"SED\"    Anesthesia Plan    ASA Status:  3 " "      Anesthesia Type: General.     - Airway: LMA              Consents    Anesthesia Plan(s) and associated risks, benefits, and realistic alternatives discussed. Questions answered and patient/representative(s) expressed understanding.     - Discussed: Risks, Benefits and Alternatives for BOTH SEDATION and the PROCEDURE were discussed     - Discussed with:  Patient            Postoperative Care    Pain management: IV analgesics, Oral pain medications.   PONV prophylaxis: Ondansetron (or other 5HT-3), Dexamethasone or Solumedrol     Comments:    Other Comments: Patient has had previous lower back ablations, wants to avoid any procedures (spinal) involving back.            Abelardo Fuller, DO    I have reviewed the pertinent notes and labs in the chart from the past 30 days and (re)examined the patient.  Any updates or changes from those notes are reflected in this note.              # Obesity: Estimated body mass index is 38.1 kg/m  as calculated from the following:    Height as of this encounter: 1.753 m (5' 9\").    Weight as of this encounter: 117 kg (258 lb).      "

## 2024-04-24 NOTE — CONSULTS
"[facility]  Consult Note - Hospitalist Service  Date of Admission:  4/24/2024  Consult Requested by: Ortho surgery  Reason for Consult: Post-operative medical management    Assessment & Plan   Joey Gross is a 63 year old male with PMHx significant for hyperlipidemia, history of colon cancer s/p sigmoid colectomy (5/2023), history of ameloblastoma of the mandible s/p left mandibular resection (8/2023), and obesity who was admitted on 4/24/2024 following left total knee arthroplasty.      Hyperlipidemia  - Resume home rosuvastatin    History of ameloblastoma of mandible s/p left mandibular resection (8/2023)  Follows with [facility]. Patient takes gabapentin for mouth pain.  - Resume home gabapentin    History of malignant neoplasm of descending colon s/p sigmoid colectomy (5/2023)  Noted. Follows with oncology at [facility].    Obesity  BMI 37.21.    S/p left total knee arthroplasty  - Analgesics, antiemetics, DVT prophylaxis, and therapies per surgery team       The patient's care was discussed with the  Hospitalist, Dr. [place] Aldrich .    Clinically Significant Risk Factors Present on Admission                       # Obesity: Estimated body mass index is 37.21 kg/m  as calculated from the following:    Height as of this encounter: 1.753 m (5' 9\").    Weight as of this encounter: 114.3 kg (252 lb).              Carol Neri PA-C  Hospitalist Service  Securely message with [employer] (more info)  Text page via [facility] Paging/Directory   ______________________________________________________________________    Chief Complaint   Post-operative medical management    History is obtained from the patient    History of Present Illness   Joey Gross is a 63 year old male with PMHx significant for hyperlipidemia, history of colon cancer (5/2023), history of ameloblastoma of the mandible s/p left mandibular resection (8/2023), and obesity who was admitted on 4/24/2024 following left " total knee arthroplasty.    Currently, patient feels that pain is adequately controlled with current regimen. Patient has tolerated initial oral intake well without significant nausea or vomiting. Patient is able to void without issue. Denies chest pain, palpitations, shortness of breath, new numbness or tingling, lightheadedness, dizziness. Patient denies history of heart attack, stroke, bleeding or clotting disorders, diabetes, obstructive sleep apnea, or peptic ulcer disease.       Past Medical History    Past Medical History:   Diagnosis Date    Arthritis     Colon cancer (H)     History of blood transfusion     Obese     PONV (postoperative nausea and vomiting)        Past Surgical History   History reviewed. No pertinent surgical history.    Medications   I have reviewed this patient's current medications       Review of Systems    The 10 point Review of Systems is negative other than noted in the HPI or here.     Social History   I have reviewed this patient's social history and updated it with pertinent information if needed.  Social History     Tobacco Use    Smoking status: Former     Current packs/day: 0.00     Types: Cigarettes     Quit date:      Years since quittin.3    Smokeless tobacco: Never   Substance Use Topics    Alcohol use: Yes     Comment: rarely    Drug use: Not Currently         Allergies   Allergies   Allergen Reactions    Bee Venom Anaphylaxis    Lovenox [Enoxaparin]      Bleeding after colon cancer surgery resection        Physical Exam   Vital Signs: Temp: 97  F (36.1  C) Temp src: Temporal BP: (!) 160/100 Pulse: 73   Resp: 18 SpO2: 94 % O2 Device: Nasal cannula Oxygen Delivery: 3 LPM  Weight: 252 lbs 0 oz    General Appearance: Awake, alert, in no acute distress.  Respiratory: Lungs clear to auscultation bilaterally.  Normal respiratory effort.  Nasal cannula in place.  Cardiovascular: Regular rate and rhythm, no murmurs.  Extremities are warm and well-perfused.  GI: Soft,  "nontender, nondistended.  Normal bowel sounds.  Skin: No obvious rashes or lesions on observed skin.  Musculoskeletal: Able to move all extremities freely.  No lower extremity edema.  Neurologic: Alert and oriented x 4.  Sensation and strength is intact and equal in bilateral upper and lower extremities.  Psychiatric: Calm, pleasant, cooperative with exam.    Medical Decision Making       35 MINUTES SPENT BY ME on the date of service doing chart review, history, exam, documentation & further activities per the note.      Data         Imaging results reviewed over the past 24 hrs:   Recent Results (from the past 24 hour(s))   POC US Guidance Needle Placement    Narrative    Ultrasound was performed as guidance to an anesthesia procedure.  Click   \"PACS images\" hyperlink below to view any stored images.  For specific   procedure details, view procedure note authored by anesthesia.   XR Knee Port Left 1/2 Views    Narrative    EXAM: XR KNEE PORT LEFT 1/2 VIEWS  LOCATION: [facility]  DATE: 4/24/2024    INDICATION: Post Op Total Knee  COMPARISON: 12/19/2023.      Impression    IMPRESSION: Cemented total knee arthroplasty. Normal joint alignment. No evidence of periprosthetic fracture or other immediate complication. Postoperative air in the joint spaces and soft tissues.     "

## 2024-04-24 NOTE — ANESTHESIA PROCEDURE NOTES
Airway       Patient location during procedure: OR       Procedure Start/Stop Times: 4/24/2024 1:27 PM and 4/24/2024 1:28 PM  Staff -        CRNA: Anibal Johnson APRN CRNA       Performed By: CRNA  Consent for Airway        Urgency: elective  Indications and Patient Condition       Indications for airway management: jelena-procedural       Induction type:intravenous       Mask difficulty assessment: 2 - vent by mask + OA or adjuvant +/- NMBA    Final Airway Details       Final airway type: endotracheal airway       Successful airway: ETT - single  Endotracheal Airway Details        ETT size (mm): 8.0       Cuffed: yes       Successful intubation technique: direct laryngoscopy       DL Blade Type: Paige 2       Grade View of Cords: 1       Adjucts: stylet       Position: Right       Measured from: lips       Secured at (cm): 23       Bite block used: None    Post intubation assessment        Placement verified by: capnometry, equal breath sounds and chest rise        Number of attempts at approach: 1       Number of other approaches attempted: 0       Secured with: tape       Ease of procedure: easy       Dentition: Intact and Unchanged       Dental guard used and removed. Dental Guard Type: Proguard Red.    Medication(s) Administered   Medication Administration Time: 4/24/2024 1:27 PM

## 2024-04-24 NOTE — PHARMACY-ADMISSION MEDICATION HISTORY
Pharmacist Admission Medication History    Admission medication history is complete. The information provided in this note is only as accurate as the sources available at the time of the update.    Information Source(s): Patient and CareEverywhere/SureScripts via in-person    Pertinent Information:   Allergies reviewed with patient and updates made in EHR: yes    Medication History Completed By: Nahed Way RPH 4/24/2024 10:36 AM    PTA Med List   Medication Sig Last Dose    cetirizine (ZYRTEC) 10 MG tablet Take 10 mg by mouth daily 4/23/2024 at AM    EPINEPHrine (ANY BX GENERIC EQUIV) 0.3 MG/0.3ML injection 2-pack Inject 0.3 mg into the muscle as needed for anaphylaxis May repeat one time in 5-15 minutes if response to initial dose is inadequate. Unknown    gabapentin (NEURONTIN) 100 MG capsule Take 200-300 mg by mouth at bedtime 4/23/2024    multivitamin w/minerals (THERA-VIT-M) tablet Take 1 tablet by mouth daily Past Week    rosuvastatin (CRESTOR) 10 MG tablet Take 10 mg by mouth daily 4/23/2024 at AM    sildenafil (VIAGRA) 100 MG tablet Take 50 mg by mouth daily as needed Unknown

## 2024-04-24 NOTE — BRIEF OP NOTE
[facility]    Brief Operative Note    Pre-operative diagnosis: Osteoarthritis of left knee [M17.12]  Post-operative diagnosis Same as pre-operative diagnosis    Procedure: LEFT TOTAL KNEE ARTHROPLASTY, Left - Knee    Surgeon: Surgeons and Role:     * Damián Dodson MD - Primary     * Joseph Talavera MD - Assisting     * Suri Brewer PA-C - Assisting  Anesthesia: General with Block   Estimated Blood Loss: Less than 100 ml    Drains: None  Specimens:   ID Type Source Tests Collected by Time Destination   A : bone from left total knee arthroplasty disposed of in OR per hospital policy Bone Fragments Knee, Left OR DOCUMENTATION ONLY Damián Dodson MD 4/24/2024  2:10 PM      Findings:   End stage medial compartment OA with Grade III/IV lateral compartment OA. Grade I patella OA .  Complications: None.  Implants:   Implant Name Type Inv. Item Serial No.  Lot No. LRB No. Used Action   BONE CEMENT SIMPLEX FULL DOSE 6191-1-001 - [id number] Cement, Bone BONE CEMENT SIMPLEX FULL DOSE 6191-1-001  [employer] [id number] Left 2 Implanted   ATTUNE FB TIB BASE SZ 7 ISELA - [id number] Total Joint Component/Insert ATTUNE FB TIB BASE SZ 7 ISELA  [employer]  Left 1 Implanted   INSERT TIB 6 6MM KN LT CRCTE RTN MDL STAB ATTUNE [id number] - [id number] Total Joint Component/Insert INSERT TIB 6 6MM KN LT CRCTE RTN MDL STAB ATTUNE [id number]  [employer]  Left 1 Implanted   IMP COMP FEM JJ ATTUNE CR LT ISELA SZ6 [id number] - [id number] Total Joint Component/Insert IMP COMP FEM JJ ATTUNE CR LT ISELA SZ6 [id number]  [employer]  Left 1 Implanted

## 2024-04-24 NOTE — ANESTHESIA CARE TRANSFER NOTE
Patient: Joey Gross    Procedure: Procedure(s):  LEFT TOTAL KNEE ARTHROPLASTY       Diagnosis: Osteoarthritis of left knee [M17.12]  Diagnosis Additional Information: No value filed.    Anesthesia Type:   General     Note:    Oropharynx: spontaneously breathing and oropharynx clear of all foreign objects  Level of Consciousness: awake  Oxygen Supplementation: face mask  Level of Supplemental Oxygen (L/min / FiO2): 6  Independent Airway: airway patency satisfactory and stable  Dentition: dentition unchanged  Vital Signs Stable: post-procedure vital signs reviewed and stable  Report to RN Given: handoff report given  Patient transferred to: PACU    Handoff Report: Identifed the Patient, Identified the Reponsible Provider, Reviewed the pertinent medical history, Discussed the surgical course, Reviewed Intra-OP anesthesia mangement and issues during anesthesia, Set expectations for post-procedure period and Allowed opportunity for questions and acknowledgement of understanding      Vitals:  Vitals Value Taken Time   /91 04/24/24 1641   Temp 36.1  C (97  F) 04/24/24 1544   Pulse 65 04/24/24 1642   Resp 0 04/24/24 1642   SpO2 100 % 04/24/24 1642   Vitals shown include unfiled device data.    Electronically Signed By: ZION Joshi CRNA  April 24, 2024  4:43 PM

## 2024-04-24 NOTE — ANESTHESIA PROCEDURE NOTES
"Adductor canal Procedure Note    Pre-Procedure   Staff -        Anesthesiologist:  Abelardo Fuller DO       Performed By: anesthesiologist       Procedure Start/Stop Times: 4/24/2024 12:20 PM and 4/24/2024 12:24 PM       Pre-Anesthestic Checklist: patient identified, IV checked, site marked, risks and benefits discussed, informed consent, monitors and equipment checked, pre-op evaluation, at physician/surgeon's request and post-op pain management  Timeout:       Correct Patient: Yes        Correct Procedure: Yes        Correct Site: Yes        Correct Position: Yes        Correct Laterality: Yes        Site Marked: Yes  Procedure Documentation  Procedure: Adductor canal       Laterality: left       Patient Position: supine       Needle Type: insulated       Needle Gauge: 21.        Needle Length (millimeters): 100        Ultrasound guided       1. Ultrasound was used to identify targeted nerve, plexus, vascular marker, or fascial plane and place a needle adjacent to it in real-time.       2. Ultrasound was used to visualize the spread of anesthetic in close proximity to the above referenced structure.       3. A permanent image is entered into the patient's record.    Assessment/Narrative         The placement was negative for: blood aspirated, painful injection and site bleeding       Paresthesias: No.       Bolus given via needle..        Secured via.        Insertion/Infusion Method: Single Shot       Complications: none    Medication(s) Administered   Bupivacaine 0.5% PF (Infiltration) - Infiltration   15 mL - 4/24/2024 12:20:00 PM  Medication Administration Time: 4/24/2024 12:20 PM      FOR [facility] ([facility]) ONLY:   Pain Team Contact information: please page the Pain Team Via FST21. Search \"Pain\". During daytime hours, please page the attending first. At night please page the resident first.      "

## 2024-04-24 NOTE — ANESTHESIA CARE TRANSFER NOTE
Patient: Joey Gross    Procedure: Procedure(s):  LEFT TOTAL KNEE ARTHROPLASTY       Diagnosis: Osteoarthritis of left knee [M17.12]  Diagnosis Additional Information: No value filed.    Anesthesia Type:   General     Note:  Anesthesia Care Transfer Notewriter  Vitals:  Vitals Value Taken Time   /91 04/24/24 1641   Temp 36.1  C (97  F) 04/24/24 1544   Pulse 74 04/24/24 1641   Resp 17 04/24/24 1641   SpO2 97 % 04/24/24 1641   Vitals shown include unfiled device data.    Electronically Signed By: ZION Joshi CRNA  April 24, 2024  4:43 PM

## 2024-04-24 NOTE — ANESTHESIA POSTPROCEDURE EVALUATION
Patient: Joey Gross    Procedure: Procedure(s):  LEFT TOTAL KNEE ARTHROPLASTY       Anesthesia Type:  General    Note:  Disposition: Admission   Postop Pain Control: Uneventful            Sign Out: Well controlled pain   PONV: No   Neuro/Psych: Uneventful            Sign Out: Acceptable/Baseline neuro status   Airway/Respiratory: Uneventful            Sign Out: Acceptable/Baseline resp. status   CV/Hemodynamics: Uneventful            Sign Out: Acceptable CV status; No obvious hypovolemia; No obvious fluid overload   Other NRE: NONE   DID A NON-ROUTINE EVENT OCCUR? No           Last vitals:  Vitals Value Taken Time   /97 04/24/24 1700   Temp 36.1  C (97  F) 04/24/24 1544   Pulse 74 04/24/24 1700   Resp 30 04/24/24 1700   SpO2 98 % 04/24/24 1700   Vitals shown include unfiled device data.    Electronically Signed By: Neida Vitale MD  April 24, 2024  5:02 PM

## 2024-04-24 NOTE — OP NOTE
Operative Report    PATIENT Joey Gross   DATE OF SURGERY:  4/24/2024      PREOPERATIVE DIAGNOSIS   Left degenerative knee osteoarthritis    POSTOPERATIVE DIAGNOSIS   Left degenerative knee osteoarthritis    PROCEDURE PERFORMED   left total knee arthroplasty    IMPLANTS  Implant Name Type Inv. Item Serial No.  Lot No. LRB No. Used Action   BONE CEMENT SIMPLEX FULL DOSE 6191-1-001 - [id number] Cement, Bone BONE CEMENT SIMPLEX FULL DOSE 6191-1-001  [employer] [id number] Left 2 Implanted   ATTUNE FB TIB BASE SZ 7 ISELA - [id number] Total Joint Component/Insert ATTUNE FB TIB BASE SZ 7 ISELA  [employer]  Left 1 Implanted   INSERT TIB 6 6MM KN LT CRCTE RTN MDL STAB ATTUNE [id number] - [id number] Total Joint Component/Insert INSERT TIB 6 6MM KN LT CRCTE RTN MDL STAB ATTUNE [id number]  [employer]  Left 1 Implanted   IMP COMP FEM JJ ATTUNE CR LT ISELA SZ6 [id number] - [id number] Total Joint Component/Insert IMP COMP FEM JJ ATTUNE CR LT ISELA SZ6 [id number]  [employer]  Left 1 Implanted       SURGEON  Damián Dodson MD    ASSISTANT   MD Suri Marlow PA-C; assistant was required for patient positioning, surgical assistance, wound closure and monitoring patient's safety throughout the case.    ANESTHESIA  General with Block      FINDINGS:  Grade IV medial compartment OA  Grade III and IV lateral compartment OA  Small region of Grade I and II patellar wear     SPECIMENS:  none    ESTIMATED BLOOD LOSS:  100 cc    COMPLICATIONS   None.      Tourniquet time:    INDICATION FOR PROCEDURE  Joey Gross is a 63 year old male with advanced arthritis of his left knee.  He has tried conservative management including injections, as well as hyaluronic acid viscosupplementation without any lasting relief of his symptoms.  After discussing the risk, benefits, and alternatives of surgical intervention he elected to proceed with a left total knee arthroplasty.        PREOPERATIVE  EXAMINATION:    Focused examination of the left knee:  Intact skin over the anterior aspect of the knee without sign of prior surgical incision, scar, abrasion, rash  ?  Trace effusion.  ?  Tender to palpation along the medial and lateral joint lines  Nonpainful to patellofemoral compression  Nontender to palpation along the hamstring Pes anserine insertion of the proximal medial tibia  ?  Alignment: Genu varum, approximately 5 degrees -fully correctable  ?  Stability: Stable to varus and valgus stress testing at both 0 and 30 degrees of flexion, as well as stable to anterior and posterior drawer testing  ?  Range of motion:5-105  ?  ?  Motor: 5/5 strength with quadriceps, hamstrings, tibialis anterior, extensor hallucis longus, flexor hallucis longus and gastrocsoleus complex muscle groups.  ?  No extensor mechanism lag  ?  Sensation: Intact sensation to light touch in the femoral cutaneous nerve distribution, superficial peroneal, deep peroneal, sural, saphenous, tibial nerve distributions of the foot.  ?  Perfusion: Palpable distal pulses  ?       DESCRIPTION OF PROCEDURE    Joey Gross was brought back to the operating room.  General anesthesia was achieved without difficulty.  The patient was then transferred to the OR table.  All bony prominences were well-padded. The patient was then prepped and draped in the usual sterile fashion.       A timeout was performed prior to the procedure.  Three separate staff members confirmed the patient's name, correct site and side of surgery and procedure being performed.  Antibiotics  and TXA were confirmed to be given prior to incision.    The limb was exsanguinated prior to incision.  We began with a standard anterior approach to the knee being certain to obtain adequate hemostasis at each level.  Once we reached the capsule level, a medial parapatellar arthrotomy was performed.  Immediately upon entry into the joint it was apparent that there were grade 4 arthritic  changes localized to the medial and lateral compartments.  Following evaluation of the joint, we turned our attention to a standard medial release to the mid sagittal line.  The patellar fat pad was removed, and the anterior aspect of the femur adequately excavated to reveal the anterolateral cortex for later sizing.  We first addressed our patella.  It had regions of minimal wear with grade 2 at the most centrally.  Given this and his age I elected to leave this without resurfacing.  I did go ahead and denervate this at this time. We next turned our attention to the femur. The ACL was sacrificed at this time, and the entry point for the intramedullary guide diane was drilled according to the patient's preoperative alignment.  Following this the diane was inserted, and 8mm of distal femur was resected.  The cut thicknesses were confirmed with a caliper on the back table .  At this time we turned our attention to removal of all distal femoral osteophytes.  Following this the femur was sized and the size 6 with confirmed laterality 4-in-1 cutting block was selected and placed. An pablo wing was used at this time to confirm no notching and we began with our anterior cut to ensure we had adequate rotation of our component.  Following this the posterior femur was finished followed by the anterior chamfer.     We next turned our attention to the tibia.  We used an extramedullary guide to set our rotation, varus and valgus alignment, depth of resection, and slope.  Following this the tibia was resected.  We proceeded to evaluate our extension and flexion gap with a spacer block which measured 5.  We felt that this was slightly loose in extension and went up to a size 6.  This provided adequate stability to varus valgus stress at 0 and 30 degrees without a concerning feature.  We also had full flexion.  Satisfied with our gaps, and ligament tensioning at this time we turned our attention to completion finishing our femur. The  trial femur was placed. Next, the tibia was exposed, and a size 7 tibial component was selected. We confirmed proper rotation as well as A/P and M/L fit. Following this we punched our tibia, and turned our attention next to trialing.  A size 6 polyetyhlene trial was selected which achieved excellent stability in both extension and throughout the flexion arc of motion. We were able to obtain ROM from full extension to 130 degrees of flexion with proper patellar tracking noted. Collateral ligaments demonstrated appropriate tensioning with firm endpoints. Satisfied with our component sizing, and stability our femoral lug holes were drilled and we turned our attention to irrigation, and final preparation prior to cementation.      The knee was copiously lavaged with Betadine followed by normal saline rinse.  Following this the surfaces were dried and exposed for cement application and component implantation.  Cement was first applied to the tibia with insertion of the tibial component, followed by the 6 sized polyethylene insert.  We next placed our femoral component in place and brought the knee into extension for further compression of the cement.  At this time excess cement was removed. At this time we went ahead and injected our local anesthetic (Ropivacaine 300mg, Ketorolac 30mg, Epinephrine 0.6mg in 0.9% NACL to make 100mL) into the periphery of the knee joint while allowing our cement adequate time to cure.  After our cemented cured, the knee was evaluated and excess cement removed. The tourniquet was let down and hemostasis was obtained. We next turned our attention to closure. The joint capsule was closed with interrupted #1 Vicryl sutures in a figure of 8 fashion. This was oversewn with a #1 stratifix. The superficial layers were closed with 0 Vicryl in the deep layers followed by 2-0 monocryl in the deep dermal layer. A 3-0 monocryl was then run in the superficial epidermis followed by Exofin skin glue. A  sterile dressing was next placed.  The patient was awakened from anesthesia and taken to the recovery area in stable condition.       POSTOPERATIVE PLAN:  Activity: WBAT LLE  Dressing: Maintain mepilex  DVT Ppx: ASA 81mg BID  Pain: PO pain medication as per protocol  ID: Complete jelena-operative AB, No oral AB  Medical issues: Home meds restarted  Dispo: General care, follow up in 2 weeks as scheduled       POSTOPERATIVE EXAM:  Resting comfortably in recovery    LLE  Mepilex dressing C/D/I  Fires GSC, EHL, TA  SILT SP, DP, TN  2+ DP    Imaging demonstrates well placed components with no concerning features      Damián Dodson MD  Orthopedic Surgery

## 2024-04-24 NOTE — INTERVAL H&P NOTE
"I have reviewed the surgical (or preoperative) H&P that is linked to this encounter, and examined the patient. There are no significant changes    Clinical Conditions Present on Arrival:  Clinically Significant Risk Factors Present on Admission                  # Obesity: Estimated body mass index is 37.21 kg/m  as calculated from the following:    Height as of this encounter: 1.753 m (5' 9\").    Weight as of this encounter: 114.3 kg (252 lb).       "

## 2024-04-25 ENCOUNTER — APPOINTMENT (OUTPATIENT)
Dept: PHYSICAL THERAPY | Facility: CLINIC | Age: 64
End: 2024-04-25
Attending: STUDENT IN AN ORGANIZED HEALTH CARE EDUCATION/TRAINING PROGRAM
Payer: COMMERCIAL

## 2024-04-25 VITALS
SYSTOLIC BLOOD PRESSURE: 140 MMHG | OXYGEN SATURATION: 96 % | BODY MASS INDEX: 37.33 KG/M2 | TEMPERATURE: 98.2 F | DIASTOLIC BLOOD PRESSURE: 82 MMHG | RESPIRATION RATE: 16 BRPM | HEART RATE: 71 BPM | WEIGHT: 252 LBS | HEIGHT: 69 IN

## 2024-04-25 LAB
FASTING STATUS PATIENT QL REPORTED: ABNORMAL
GLUCOSE SERPL-MCNC: 139 MG/DL (ref 70–99)
HGB BLD-MCNC: 14.5 G/DL (ref 13.3–17.7)

## 2024-04-25 PROCEDURE — 85018 HEMOGLOBIN: CPT

## 2024-04-25 PROCEDURE — 250N000011 HC RX IP 250 OP 636

## 2024-04-25 PROCEDURE — 250N000013 HC RX MED GY IP 250 OP 250 PS 637

## 2024-04-25 PROCEDURE — 36415 COLL VENOUS BLD VENIPUNCTURE: CPT | Performed by: STUDENT IN AN ORGANIZED HEALTH CARE EDUCATION/TRAINING PROGRAM

## 2024-04-25 PROCEDURE — 97161 PT EVAL LOW COMPLEX 20 MIN: CPT | Mod: GP

## 2024-04-25 PROCEDURE — 97116 GAIT TRAINING THERAPY: CPT | Mod: GP

## 2024-04-25 PROCEDURE — 82947 ASSAY GLUCOSE BLOOD QUANT: CPT | Performed by: STUDENT IN AN ORGANIZED HEALTH CARE EDUCATION/TRAINING PROGRAM

## 2024-04-25 PROCEDURE — 99232 SBSQ HOSP IP/OBS MODERATE 35: CPT | Performed by: STUDENT IN AN ORGANIZED HEALTH CARE EDUCATION/TRAINING PROGRAM

## 2024-04-25 PROCEDURE — 97110 THERAPEUTIC EXERCISES: CPT | Mod: GP

## 2024-04-25 PROCEDURE — 999N000111 HC STATISTIC OT IP EVAL DEFER

## 2024-04-25 RX ADMIN — CEFAZOLIN SODIUM 2 G: 2 INJECTION, SOLUTION INTRAVENOUS at 05:15

## 2024-04-25 RX ADMIN — HYDROXYZINE HYDROCHLORIDE 25 MG: 25 TABLET ORAL at 08:02

## 2024-04-25 RX ADMIN — OXYCODONE 5 MG: 5 TABLET ORAL at 00:26

## 2024-04-25 RX ADMIN — HYDROXYZINE HYDROCHLORIDE 25 MG: 25 TABLET ORAL at 00:26

## 2024-04-25 RX ADMIN — CETIRIZINE HYDROCHLORIDE 10 MG: 10 TABLET, FILM COATED ORAL at 08:03

## 2024-04-25 RX ADMIN — OXYCODONE 5 MG: 5 TABLET ORAL at 08:02

## 2024-04-25 RX ADMIN — SENNOSIDES AND DOCUSATE SODIUM 1 TABLET: 8.6; 5 TABLET ORAL at 08:03

## 2024-04-25 RX ADMIN — ACETAMINOPHEN 975 MG: 325 TABLET ORAL at 03:07

## 2024-04-25 RX ADMIN — ASPIRIN 81 MG: 81 TABLET, COATED ORAL at 08:03

## 2024-04-25 RX ADMIN — ROSUVASTATIN CALCIUM 10 MG: 10 TABLET, FILM COATED ORAL at 08:02

## 2024-04-25 ASSESSMENT — ACTIVITIES OF DAILY LIVING (ADL)
ADLS_ACUITY_SCORE: 28

## 2024-04-25 NOTE — PROGRESS NOTES
Patient vital signs are at baseline: Yes  Patient able to ambulate as they were prior to admission or with assist devices provided by therapies during their stay:  Yes  Patient MUST void prior to discharge:  Yes  Patient able to tolerate oral intake:  Yes  Pain has adequate pain control using Oral analgesics:  Yes  Does patient have an identified :  Yes  Has goal D/C date and time been discussed with patient:  Yes     Patient discharge today, transferred to discharge lounge.

## 2024-04-25 NOTE — PROGRESS NOTES
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     Obstructive sleep apnea  Overnight was put on oxygen.  He was diagnosed with sleep apnea which got better after tooth surgery.  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 Patient takes gabapentin for mouth pain.  - Resume home gabapentin     History of malignant neoplasm of descending colon s/p sigmoid colectomy (5/2023)  Noted. Follows with oncology at [facility].     Obesity  BMI 37.21.     S/p left total knee arthroplasty  - Analgesics, antiemetics, DVT prophylaxis, and therapies per surgery team          Clinically Significant Risk Factors Present on Admission                       # Obesity: Estimated body mass index is 37.21 kg/m  as calculated from the following:    Height as of this encounter: 1.753 m (5' 9\").    Weight as of this encounter: 114.3 kg (252 lb).              Interval History/Subjective:  He reports doing well.  He has some struggles with constipation prior.  No chest pain, dyspnea, or nausea.      Last 24H PRN:     hydrOXYzine HCl (ATARAX) tablet 25 mg, 25 mg at 04/25/24 0802    midazolam (VERSED) injection 2 mg, 2 mg at 04/24/24 1218    oxyCODONE (ROXICODONE) tablet 5 mg, 5 mg at 04/25/24 0802 **OR** oxyCODONE (ROXICODONE) tablet 10 mg    Physical " Exam/Objective:  Temp:  [96.6  F (35.9  C)-98.2  F (36.8  C)] 98.2  F (36.8  C)  Pulse:  [56-90] 71  Resp:  [12-30] 16  BP: (113-167)/() 140/82  SpO2:  [87 %-100 %] 96 %  Wt Readings from Last 4 Encounters:   04/24/24 114.3 kg (252 lb)     Body mass index is 37.21 kg/m .    General Appearance: Alert and wake, not in distress  Respiratory: clear lungs, no crackles or wheezing  Cardiovascular: rhythmic, normal S1 and S2, no murmur  GI: soft, non-tender, normal bowel sound  Neurology: oriented x 3  Psych: cooperative and calm, normal affect    Medications:   Personally Reviewed.  Medications   Current Facility-Administered Medications   Medication Dose Route Frequency Provider Last Rate Last Admin    lactated ringers infusion   Intravenous Continuous Suri Brewer PA-C   Infusion stopped per MD order at 04/25/24 0318     Current Facility-Administered Medications   Medication Dose Route Frequency Provider Last Rate Last Admin    acetaminophen (TYLENOL) tablet 975 mg  975 mg Oral Q8H Suri Brewer PA-C   975 mg at 04/25/24 0307    aspirin EC tablet 81 mg  81 mg Oral BID Suri Brewer PA-C   81 mg at 04/25/24 0803    cetirizine (zyrTEC) tablet 10 mg  10 mg Oral Daily Carol Neri PA-C   10 mg at 04/25/24 0803    gabapentin (NEURONTIN) capsule 200-300 mg  200-300 mg Oral At Bedtime Carol Neri PA-C   200 mg at 04/24/24 2148    polyethylene glycol (MIRALAX) Packet 17 g  17 g Oral Daily Suri Brewer PA-C        rosuvastatin (CRESTOR) tablet 10 mg  10 mg Oral Daily Carol Neri PA-C   10 mg at 04/25/24 0802    senna-docusate (SENOKOT-S/PERICOLACE) 8.6-50 MG per tablet 1 tablet  1 tablet Oral BID Suri Brewer PA-C   1 tablet at 04/25/24 0803    sodium chloride (PF) 0.9% PF flush 3 mL  3 mL Intracatheter Q8H Suri Brewer PA-C   3 mL at 04/24/24 1750       Data reviewed today: I personally reviewed all new medications, labs, imaging/diagnostics reports over the past 24 hours.  "Pertinent findings include:    Imaging:   Recent Results (from the past 24 hour(s))   POC US Guidance Needle Placement    Narrative    Ultrasound was performed as guidance to an anesthesia procedure.  Click   \"PACS images\" hyperlink below to view any stored images.  For specific   procedure details, view procedure note authored by anesthesia.   XR Knee Port Left 1/2 Views    Narrative    EXAM: XR KNEE PORT LEFT 1/2 VIEWS  LOCATION: [facility]  DATE: 4/24/2024    INDICATION: Post Op Total Knee  COMPARISON: 12/19/2023.      Impression    IMPRESSION: Cemented total knee arthroplasty. Normal joint alignment. No evidence of periprosthetic fracture or other immediate complication. Postoperative air in the joint spaces and soft tissues.       Labs:  XR Knee Port Left 1/2 Views   Final Result   IMPRESSION: Cemented total knee arthroplasty. Normal joint alignment. No evidence of periprosthetic fracture or other immediate complication. Postoperative air in the joint spaces and soft tissues.      POC US Guidance Needle Placement   Final Result        Recent Results (from the past 24 hour(s))   Hemoglobin    Collection Time: 04/25/24  6:16 AM   Result Value Ref Range    Hemoglobin 14.5 13.3 - 17.7 g/dL   Glucose    Collection Time: 04/25/24  6:16 AM   Result Value Ref Range    Glucose 139 (H) 70 - 99 mg/dL    Patient Fasting > 8hrs? Unknown        Pending Labs:  Unresulted Labs Ordered in the Past 30 Days of this Admission       No orders found for last 31 day(s).                ASTRID PABON MD  Hospitalist  [facility] Medicine  [facility]  Phone: #[phone]    Securely message me with Guillermina (more info)    "

## 2024-04-25 NOTE — PLAN OF CARE
DISCHARGE LOUNGE NOTE    Patient discharged to home at 12:17 PM via wheel chair. Accompanied by significant other and staff. Discharge instructions reviewed with patient, opportunity offered to ask questions. Prescriptions sent to patients preferred pharmacy. All belongings sent with patient.    Ryley aTvera RN

## 2024-04-25 NOTE — PROGRESS NOTES
04/25/24 0800   Appointment Info   Signing Clinician's Name / Credentials (PT) MICHAEL Hutchins   Student Supervision Therapy services provided with the co-signing licensed therapist guiding and directing the services, and providing the skilled judgement and assessment throughout the session;Direct supervision provided   Quick Adds   Quick Adds Certification   Living Environment   People in Home significant other   Current Living Arrangements house   Home Accessibility stairs to enter home   Number of Stairs, Main Entrance 2   Stair Railings, Main Entrance none   Number of Stairs, Within Home, Primary greater than 10 stairs   Stair Railings, Within Home, Primary railings safe and in good condition   Transportation Anticipated family or friend will provide   Living Environment Comments Pt will live on main level   Self-Care   Usual Activity Tolerance good   Current Activity Tolerance good   Equipment Currently Used at Home shower chair   Fall history within last six months no   Activity/Exercise/Self-Care Comment Owns 4WW   General Information   Onset of Illness/Injury or Date of Surgery 04/24/24   Referring Physician Damián Dodson MD   Patient/Family Therapy Goals Statement (PT) To go home   Pertinent History of Current Problem (include personal factors and/or comorbidities that impact the POC) s/p L TKA   Existing Precautions/Restrictions no known precautions/restrictions   Weight-Bearing Status - LLE weight-bearing as tolerated   Weight-Bearing Status - RLE weight-bearing as tolerated   Range of Motion (ROM)   Range of Motion ROM deficits secondary to surgical procedure   Strength (Manual Muscle Testing)   Strength (Manual Muscle Testing) strength is WFL   Transfers   Transfers sit-stand transfer   Maintains Weight-bearing Status (Transfers) able to maintain   Sit-Stand Transfer   Sit-Stand [place] (Transfers) contact guard;verbal cues   Assistive Device (Sit-Stand Transfers) walker, front-wheeled    Gait/Stairs (Locomotion)   [place] Level (Gait) contact guard;verbal cues   Assistive Device (Gait) walker, front-wheeled   Distance in Feet (Gait) 10'   Pattern (Gait) step-to   Deviations/Abnormal Patterns (Gait) antalgic;base of support, wide;leyda decreased;gait speed decreased;stride length decreased   Maintains Weight-bearing Status (Gait) able to maintain   Clinical Impression   Criteria for Skilled Therapeutic Intervention Yes, treatment indicated   PT Diagnosis (PT) Impaired functional mobility   Influenced by the following impairments weakness, pain   Functional limitations due to impairments Transfers, gait, stairs   Clinical Presentation (PT Evaluation Complexity) stable   Clinical Presentation Rationale Pt presents as medically diagnosed   Clinical Decision Making (Complexity) low complexity   Planned Therapy Interventions (PT) gait training;home exercise program;patient/family education;ROM (range of motion);stair training;strengthening;transfer training   Risk & Benefits of therapy have been explained patient   PT Total Evaluation Time   PT Eval, Low Complexity Minutes (58032) 10   Therapy Certification   Start of care date 04/25/24   Certification date from 04/25/24   Certification date to 05/23/24   Physical Therapy Goals   PT Frequency One time eval and treatment only   PT Predicted Duration/Target Date for Goal Attainment 04/30/24   PT Goals Transfers;Gait;Stairs;PT Goal 1   PT: Transfers Modified independent;Sit to/from stand;Assistive device;Goal Met   PT: Gait Modified independent;Rolling walker;Goal Met;50 feet   PT: Stairs 2 stairs;Supervision/stand-by assist;Assistive device;Goal Met   PT: Goal 1 Pt demonstrates independence with HEP; Goal Met   Interventions   Interventions Quick Adds Gait Training;Therapeutic Activity;Therapeutic Procedure   Therapeutic Procedure/Exercise   Ther. Procedure: strength, endurance, ROM, flexibillity Minutes (57436) 15   Symptoms Noted During/After  Treatment increased pain   Treatment Detail/Skilled Intervention TKA exercise protocol x 10 repetitions [place]. Verbal and tactile cueing for techniue. Education on importance of strengthening during recovery to re-establish neuromuscular connections.   Therapeutic Activity   Treatment Detail/Skilled Intervention 2x sit<>stand w/ FWW and Mod I. Verbal cueing for safe hand placement and safety. Verbal cueing to kick leg out for pain.   Gait Training   Gait Training Minutes (67954) 15   Symptoms Noted During/After Treatment (Gait Training) increased pain   Treatment Detail/Skilled Intervention pt ambulated 2 x 60' w/ FWW and Mod I. Verbal cueing for walker management and navigation, emphasis on staying inside handle bars of walker. Education on role of quad in prevention of buckling. Ambulated 5' w/ 4WW and Mod I to trial 4WW, similar to one owned at home, pt preferred stability of FWW. 2x stairs w/ SBA and SEC, ascending anteriorly and descending posteriorly. SEC in RUE, LUE around therapist shoulders to simulate no rail environment at home. Education on non-reciprocal patterning, importance of safety, and role of cane for additional point of stability.   Distance in Feet 60', 60'   [place] Level (Gait Training) independent   Physical Assistance Level (Gait Training) verbal cues   Weight Bearing (Gait Training) weight-bearing as tolerated   Assistive Device (Gait Training) rolling walker   Pattern Analysis (Gait Training) swing-to gait   Gait Analysis Deviations decreased leyda;decreased step length;increased stride width;decreased weight-shifting ability   Impairments (Gait Analysis/Training) pain;strength decreased   PT Discharge Planning   PT Plan DC PT   PT Discharge Recommendation (DC Rec) (S)  other (see comments)  (defer to ortho)   PT Rationale for DC Rec Pt ambulating 2 x 60' w/ FWW and Mod I, navigating 2 stairs safely w/ SBA. Has fiance support at home as needed. Able to live on main level.    PT Brief overview of current status Pt ambulating 2 x 60'w/ FWW and Mod I. 2 x stairs w/ SBA   PT Equipment Needed at Discharge walker, rolling   Total Session Time   Timed Code Treatment Minutes 30   Total Session Time (sum of timed and untimed services) 40     [facility]  OUTPATIENT PHYSICAL THERAPY EVALUATION  PLAN OF TREATMENT FOR OUTPATIENT REHABILITATION  (COMPLETE FOR INITIAL CLAIMS ONLY)  Patient's Last Name, First Name, M.I.  YOB: 1960  Joey Gross                        Provider's Name  [facility] Medical Record No.  [id number]                             Onset Date:  04/24/24   Start of Care Date:  04/25/24   Type:     _X_PT   ___OT   ___SLP Medical Diagnosis:                 PT Diagnosis:  Impaired functional mobility Visits from SOC:  1     See note for plan of treatment, functional goals and certification details    I CERTIFY THE NEED FOR THESE SERVICES FURNISHED UNDER        THIS PLAN OF TREATMENT AND WHILE UNDER MY CARE     (Physician co-signature of this document indicates review and certification of the therapy plan).

## 2024-04-25 NOTE — PROGRESS NOTES
Occupational Therapy: Orders received, chart reviewed. Per PT eval, patient does not require skilled OT services at this time due to ind with ADLs following TKA - good support from family at home.  Will defer to PT for discharge recommendations.  Plan was discussed with PT and nursing.  Will complete current OT orders. Thank you.    4/25/2024 by Justa Beard, OTR, OTR/L

## 2024-04-25 NOTE — PLAN OF CARE
Problem: Knee Arthroplasty  Goal: Optimal Pain Control and Function  Outcome: Progressing     Problem: Knee Arthroplasty  Goal: Effective Urinary Elimination  Outcome: Progressing   Goal Outcome Evaluation:       Patient alert and oriented. VSS. RA. LR infusing at 75ml/hr. Tolerating Regular diet.  Has not been out of bed yet. Voiding adequately, utilizing urinal. Pain minimal. Calls appropriately. Call light within reach. Alarms on.

## 2024-04-25 NOTE — PLAN OF CARE
Problem: Adult Inpatient Plan of Care  Goal: Plan of Care Review  Description: The Plan of Care Review/Shift note should be completed every shift.  The Outcome Evaluation is a brief statement about your assessment that the patient is improving, declining, or no change.  This information will be displayed automatically on your shift  note.  Outcome: Progressing   Goal Outcome Evaluation:  Patient vital signs are at baseline: Yes, but pt did require O2 when sleeping, see flowsheet.   Patient able to ambulate as they were prior to admission or with assist devices provided by therapies during their stay:  Yes  Patient MUST void prior to discharge:  Yes  Patient able to tolerate oral intake:  Yes  Pain has adequate pain control using Oral analgesics:  Yes  Does patient have an identified :  Yes  Has goal D/C date and time been discussed with patient:  Yes    Pt is alert and oriented x4. Pt's vital signs are stable, see flowsheet for details. Pt is voiding without difficulty and adequately. Pt denies flank and bladder pain. Pt's pain is controlled with oral pain medications. Ice applied. Pt's dressing is CDI. IS encouraged. CMS intact. Up X1 with gaitbelt and FWW. Pt is tolerating diet. Calls appropriately and can make needs known. Pt's alarms on. Nurse will continue to monitor.

## 2024-04-25 NOTE — DISCHARGE SUMMARY
"ORTHOPEDIC DISCHARGE SUMMARY       Joey Gross,  1960, MRN [id number]    Admission Date: 2024      Admission Diagnoses: Osteoarthritis of left knee [M17.12]     Discharge Date:  2024    Post-operative Day:  1 Day Post-Op    Reason for Admission: The patient was admitted for the following: Procedure(s):  LEFT TOTAL KNEE ARTHROPLASTY    BRIEF HOSPITAL COURSE   Joey Gross is a pleasant 63 year old male who underwent the aforementioned procedure with Dr. Dodson on 24. There were no intraoperative complications and the patient was transferred to the recovery room and later the orthopedic unit in stable condition. Once the patient reached the orthopedic floor our orthopedic pain protocol was implemented along with the following:    Anticoagulation Medications: ASA  Therapy: PT and OT  Activity: WBAT  Bracing: None    Consultations during Admission: Hospitalist service for medical management     COMPLICATIONS/SIGNIFICANT FINDINGS        DISCHARGE INFORMATION   Condition at discharge: Good  Discharge destination: Home  Patient was seen by myself on the date of discharge.    FOLLOW UP CARE   Follow up with orthopedics in 2 weeks or sooner should the need arise. Ortho will continue to manage pain control, post op anticoagulation and incision care.     Follow up with your PCP for management of chronic medical problems and to evaluate for post op medical complications including constipation, nausea/vomiting, DVT/PE, anemia, changes in blood pressure, fevers/chills, urinary retention and atelectasis/pneumonia.     Subjective   Patient is doing well on POD #1. Pain is well controlled with oral medications. Ambulating. Tolerating oral intake.     Physical Exam   BP (!) 160/75 (BP Location: Left arm)   Pulse 71   Temp 98.2  F (36.8  C) (Oral)   Resp 16   Ht 1.753 m (5' 9\")   Wt 114.3 kg (252 lb)   SpO2 96%   BMI 37.21 kg/m    The patient is A&Ox3. Appears comfortable, sitting up at " bedside.  Sensation is intact to light touch & equal bilaterally in the L2 through S1 dermatomes.  Calves are soft and non-tender.  Dorsiflexion and plantar flexion is intact bilaterally.  Appropriate flexion and extension of the toes bilaterally.   Brisk capillary refill in the toes bilaterally.   Palpable left dorsalis pedis pulse.  left knee dressing C/D/I.     Pertinent Results at Discharge     Hemoglobin   Date/Time Value Ref Range Status   04/25/2024 06:16 AM 14.5 13.3 - 17.7 g/dL Final       Problem List   Active Problems:    S/P total knee arthroplasty, left      France Carrillo PA-C/Dr. Dodson  [facility]  [phone]  Date: 4/25/2024  Time: 9:08 AM

## 2024-04-25 NOTE — PROGRESS NOTES
"Orthopedic Progress Note      Assessment: 1 Day Post-Op  S/P Procedure(s):  LEFT TOTAL KNEE ARTHROPLASTY @    Plan:   - Continue PT/OT.   - Weightbearing status: WBAT.  - Anticoagulation: ASA in addition to SCDs, franco stockings and early ambulation.  - Discharge planning: Discharge to home today.     Subjective:  Pain: Well controlled on Tylenol & oxycodone.  Nausea, Vomiting:  No  Chest pain: No  Lightheadedness, Dizziness:  No  Neuro:  Patient denies new onset numbness or paresthesias in left lower extremity     Patient is doing well on POD #1. Ambulating, tolerating oral intake, voiding & pain is controlled with oral medication. Ready for discharge. Hgb 14.5.     Objective:  BP (!) 160/75 (BP Location: Left arm)   Pulse 71   Temp 98.2  F (36.8  C) (Oral)   Resp 16   Ht 1.753 m (5' 9\")   Wt 114.3 kg (252 lb)   SpO2 96%   BMI 37.21 kg/m    The patient is A&Ox3. Appears comfortable, sitting up at bedside.  Sensation is intact to light touch & equal bilaterally in the L2 through S1 dermatomes.  Calves are soft and non-tender.  Dorsiflexion and plantar flexion is intact bilaterally.  Appropriate flexion and extension of the toes bilaterally.   Brisk capillary refill in the toes bilaterally.   Palpable left dorsalis pedis pulse.  left knee dressing C/D/I.       Pertinent Labs   Lab Results: personally reviewed.   No results found for: \"INR\", \"PROTIME\"  Lab Results   Component Value Date    HGB 14.5 04/25/2024     No results found for: \"NA\", \"CO2\"      Report completed by:  France Carrillo PA-C/Dr. [facility]    Date: 4/25/2024  Time: 9:07 AM     "

## (undated) DEVICE — SUTURE MONOCRYL 3-0 18 PS2 UND MCP497G

## (undated) DEVICE — SUTURE STRATAFIX PDS 1 CTX 45CM

## (undated) DEVICE — HOLDER LIMB VELCRO OR 0814-1533

## (undated) DEVICE — DRESSING MEPILEX AG SILVER 4X12 395990

## (undated) DEVICE — GLOVE BIOGEL PI INDICATOR 8.0 LF 41680

## (undated) DEVICE — GLOVE BIOGEL INDICATOR 7.5 LF 41675

## (undated) DEVICE — GLOVE UNDER INDICATOR PI SZ 7.0 LF 41670

## (undated) DEVICE — SOL NACL 0.9% IRRIG 1000ML BOTTLE 2F7124

## (undated) DEVICE — HOOD SURG T7PLUS PEEL AWAY FACE SHIELD STRL LF 0416-801-100

## (undated) DEVICE — GOWN XLG DISP 9545

## (undated) DEVICE — HOOD SURG T7 PLUS STRL LF DISP 0416-801-200

## (undated) DEVICE — CUSTOM PACK TOTAL KNEE SOP5BTKHEC

## (undated) DEVICE — SOL WATER IRRIG 1000ML BOTTLE 2F7114

## (undated) DEVICE — CUSTOM PACK TOTAL KNEE ACCESSORY SOP5BTAHEA

## (undated) DEVICE — GLOVE BIOGEL PI ULTRATOUCH G SZ 7.5 42175

## (undated) DEVICE — ELECTRODE PATIENT RETURN ADULT L10 FT 2 PLATE CORD 0855C

## (undated) DEVICE — PREP CHLORAPREP W/ORANGE TINT 10.5ML 930715

## (undated) DEVICE — BLADE SAW SAGITTAL STRK DUAL CUT 4118-135-090

## (undated) DEVICE — SU ETHIBOND 5 V-37 4X30" MB66G

## (undated) DEVICE — SUTURE VICRYL+ 0 27IN CT-1 UND VCP260H

## (undated) DEVICE — DECANTER VIAL 2006S

## (undated) DEVICE — SU DERMABOND ADVANCED .7ML DNX12

## (undated) DEVICE — BLADE SAGITTAL LONG NARROW THIN 2108-103

## (undated) DEVICE — BONE CLEANING TIP INTERPULSE  0210-010-000

## (undated) DEVICE — SUCTION MANIFOLD NEPTUNE 2 SYS 4 PORT 0702-020-000

## (undated) DEVICE — SOL ISOPROPYL RUBBING ALCOHOL USP 70% 4OZ HDX-20 I0020

## (undated) DEVICE — SOLUTION IRRIG 2B7127 .9NS 3000ML BAG

## (undated) DEVICE — SUTURE MONOCRYL+ 2-0 CT-1 36" UNDYED MCP945H

## (undated) DEVICE — CAST PADDING 6IN COTTON WEBRIL STERILE 2554

## (undated) RX ORDER — LIDOCAINE HYDROCHLORIDE 10 MG/ML
INJECTION, SOLUTION EPIDURAL; INFILTRATION; INTRACAUDAL; PERINEURAL
Status: DISPENSED
Start: 2024-04-24

## (undated) RX ORDER — DEXAMETHASONE SODIUM PHOSPHATE 10 MG/ML
INJECTION, EMULSION INTRAMUSCULAR; INTRAVENOUS
Status: DISPENSED
Start: 2024-04-24

## (undated) RX ORDER — PROPOFOL 10 MG/ML
INJECTION, EMULSION INTRAVENOUS
Status: DISPENSED
Start: 2024-04-24

## (undated) RX ORDER — FENTANYL CITRATE 50 UG/ML
INJECTION, SOLUTION INTRAMUSCULAR; INTRAVENOUS
Status: DISPENSED
Start: 2024-04-24

## (undated) RX ORDER — ONDANSETRON 2 MG/ML
INJECTION INTRAMUSCULAR; INTRAVENOUS
Status: DISPENSED
Start: 2024-04-24